# Patient Record
Sex: MALE | Race: WHITE | NOT HISPANIC OR LATINO | URBAN - METROPOLITAN AREA
[De-identification: names, ages, dates, MRNs, and addresses within clinical notes are randomized per-mention and may not be internally consistent; named-entity substitution may affect disease eponyms.]

---

## 2024-11-15 ENCOUNTER — OFFICE VISIT (OUTPATIENT)
Dept: URGENT CARE | Facility: CLINIC | Age: 19
End: 2024-11-15
Payer: COMMERCIAL

## 2024-11-15 VITALS
TEMPERATURE: 98 F | BODY MASS INDEX: 26.69 KG/M2 | SYSTOLIC BLOOD PRESSURE: 122 MMHG | DIASTOLIC BLOOD PRESSURE: 70 MMHG | WEIGHT: 208 LBS | HEIGHT: 74 IN | HEART RATE: 87 BPM | RESPIRATION RATE: 18 BRPM | OXYGEN SATURATION: 97 %

## 2024-11-15 DIAGNOSIS — K59.09 OTHER CONSTIPATION: ICD-10-CM

## 2024-11-15 DIAGNOSIS — R07.9 CHEST PAIN AT REST: Primary | ICD-10-CM

## 2024-11-15 DIAGNOSIS — Z87.19 HISTORY OF GASTROESOPHAGEAL REFLUX (GERD): ICD-10-CM

## 2024-11-15 DIAGNOSIS — R06.02 SOB (SHORTNESS OF BREATH): ICD-10-CM

## 2024-11-15 DIAGNOSIS — J45.990 BRONCHOSPASM, EXERCISE-INDUCED: ICD-10-CM

## 2024-11-15 LAB
OHS QRS DURATION: 98 MS
OHS QTC CALCULATION: 431 MS

## 2024-11-15 PROCEDURE — 74018 RADEX ABDOMEN 1 VIEW: CPT | Mod: S$GLB,,, | Performed by: RADIOLOGY

## 2024-11-15 PROCEDURE — 93005 ELECTROCARDIOGRAM TRACING: CPT | Mod: S$GLB,,, | Performed by: NURSE PRACTITIONER

## 2024-11-15 PROCEDURE — 93010 ELECTROCARDIOGRAM REPORT: CPT | Mod: S$GLB,,, | Performed by: INTERNAL MEDICINE

## 2024-11-15 PROCEDURE — 71046 X-RAY EXAM CHEST 2 VIEWS: CPT | Mod: S$GLB,,, | Performed by: RADIOLOGY

## 2024-11-15 RX ORDER — ALUMINUM HYDROXIDE, MAGNESIUM HYDROXIDE, AND SIMETHICONE 1200; 120; 1200 MG/30ML; MG/30ML; MG/30ML
30 SUSPENSION ORAL
Status: COMPLETED | OUTPATIENT
Start: 2024-11-15 | End: 2024-11-15

## 2024-11-15 RX ORDER — DIPHENHYDRAMINE HCL 12.5MG/5ML
12.5 ELIXIR ORAL
Status: COMPLETED | OUTPATIENT
Start: 2024-11-15 | End: 2024-11-15

## 2024-11-15 RX ORDER — LIDOCAINE HYDROCHLORIDE 20 MG/ML
15 SOLUTION OROPHARYNGEAL
Status: COMPLETED | OUTPATIENT
Start: 2024-11-15 | End: 2024-11-15

## 2024-11-15 RX ADMIN — ALUMINUM HYDROXIDE, MAGNESIUM HYDROXIDE, AND SIMETHICONE 30 ML: 1200; 120; 1200 SUSPENSION ORAL at 03:11

## 2024-11-15 RX ADMIN — Medication 12.5 MG: at 04:11

## 2024-11-15 RX ADMIN — LIDOCAINE HYDROCHLORIDE 15 ML: 20 SOLUTION OROPHARYNGEAL at 04:11

## 2024-11-15 NOTE — PATIENT INSTRUCTIONS
Chest Pain That Is Not Caused by the Heart Discharge Instructions   About this topic   Chest pain is not always caused by heart disease. There are many other things that can cause it. Muscle strain, lung problems, acid reflux, swallowing tube irritation, and anxiety can all cause chest pain. Treatment will depend on the cause. Rest and pain drugs may be used to treat muscle strain. Changing your eating habits and using drugs that lower stomach acid may be used to treat stomach problems. Knowing the pain is not coming from your heart may help with anxiety. Sometimes, calming drugs are needed.  You may have had one or more of the following signs:  Burning feeling in your throat, upper belly, or behind the chest bone  Fast heartbeat  Problems breathing like breathing fast, wheezing, coughing, feeling short of breath  Feeling faint or sweating  Anxiety  Pain in your shoulder, upper back, chest wall, or muscles. This may be due to lifting a heavy object.  Feeling of a lump or tightness of the food pipe in the chest  Upset stomach, belly pain, or throwing up    What care is needed at home?   Ask your doctor what you need to do when you go home. Make sure you ask questions if you do not understand what the doctor says. This way you will know what you need to do.  If your doctor tells you to use heat, put a heating pad on the painful part for no more than 20 minutes at a time. Never go to sleep with a heating pad on as this can cause burns.  If the chest pain is caused by coughing, using a cool mist humidifier may help your breathing.      If you have been discharged from the clinic prior to your point of care test results being completed, please make sure to check your [employer] account.  If there is a change in treatment, we will communicate with you through here.  If your test is positive, and medications are ordered, these will be sent to your preferred pharmacy.   If your test is negative, no further steps needed. If  you do not hear from us or have questions, please call the clinic.        - You must understand that you have received an Urgent Care treatment only and that you may be released before all of your medical problems are known or treated.   - You, the patient, will arrange for follow up care as instructed with your primary care provider or recommended specialist.   -  Please arrange follow up with your primary medical clinic as soon as possible.   - If your condition worsens or fails to improve we recommend that you receive another evaluation at the ER immediately or contact your PCP to discuss your concerns, or return here.   - Please do not drive or make any important decisions for 24 hours if you have received any pain medications, sedatives or mood altering drugs during your visit.    WE CANNOT RULE OUT ALL POSSIBLE CAUSES OF YOUR SYMPTOMS IN THE URGENT CARE SETTING.  PLEASE GO TO THE ER IF YOU FEELS YOUR CONDITION IS WORSENING OR YOU WOULD LIKE EMERGENT EVALUATION.  GO TO THE EMERGENCY DEPARTMENT FOR ANY NEW OR WORSENING SYMPTOMS INCLUDING:  WORSENING ABDOMINAL PAIN, DARK/BLACK/BLOODY BOWEL MOVEMENTS, VOMITING BLOOD, HARD ABDOMEN, FEVER, CHEST PAIN, SHORTNESS OF BREATH, LOSS OF CONSCIOUSNESS, OR ANY OTHER CONCERN.

## 2024-11-15 NOTE — PROGRESS NOTES
Patient presents with chest pain left side of chest with sharp pain.Patient stated when he takes a deep breath it hurts,if he bends over or turns his body.   Symptoms started at about 1 pm

## 2024-11-15 NOTE — PROGRESS NOTES
"Subjective:      Patient ID: Pranay Tariq is a 19 y.o. male.    Vitals:  height is 6' 2" (1.88 m) and weight is 94.3 kg (208 lb 0.1 oz). His oral temperature is 98 °F (36.7 °C). His blood pressure is 122/70 and his pulse is 87. His respiration is 18 and oxygen saturation is 97%.     Chief Complaint: Chest Pain      Patient presents with chest pain left side of chest with sharp pain.Patient stated when he takes a deep breath it hurts,if he bends over or turns his body.   Symptoms started at about 1 pm.  This is a re-occurring problem that is usually self limiting, but has lasted longer than usual.         Chest Pain   This is a chronic problem. The current episode started more than 1 year ago. The onset quality is sudden. The problem occurs intermittently. The problem has been gradually worsening. The pain is present in the lateral region. The pain is at a severity of 7/10. The pain is moderate. The quality of the pain is described as burning and sharp. The pain does not radiate. Associated symptoms include shortness of breath. The pain is aggravated by nothing. He has tried nothing for the symptoms. Risk factors include male gender.       Cardiovascular:  Positive for chest pain.   Respiratory:  Positive for shortness of breath.       Objective:     Physical Exam   Constitutional: He is oriented to person, place, and time. He appears well-developed. He is cooperative.  Non-toxic appearance. He does not appear ill. No distress.   HENT:   Head: Normocephalic and atraumatic.   Ears:   Right Ear: Hearing, tympanic membrane, external ear and ear canal normal.   Left Ear: Hearing, tympanic membrane, external ear and ear canal normal.   Nose: Nose normal. No mucosal edema, rhinorrhea or nasal deformity. No epistaxis. Right sinus exhibits no maxillary sinus tenderness and no frontal sinus tenderness. Left sinus exhibits no maxillary sinus tenderness and no frontal sinus tenderness.   Mouth/Throat: Uvula is midline, " oropharynx is clear and moist and mucous membranes are normal. No trismus in the jaw. Normal dentition. No uvula swelling. No posterior oropharyngeal erythema.   Eyes: Conjunctivae and lids are normal. Right eye exhibits no discharge. Left eye exhibits no discharge. No scleral icterus.   Neck: Trachea normal and phonation normal. Neck supple. No JVD present.   Cardiovascular: Normal rate, S1 normal, S2 normal, normal heart sounds and normal pulses. A regularly irregular rhythm present.   No murmur heard.Exam reveals no distant heart sounds.   Pulmonary/Chest: Effort normal and breath sounds normal. No respiratory distress.   Pain with movement, but no pain with palpation.  Pain is intermittent, and has occurred in the past several times with eventual relief.             Comments: Pain with movement, but no pain with palpation.  Pain is intermittent, and has occurred in the past several times with eventual relief.    Abdominal: Normal appearance and bowel sounds are normal. He exhibits no distension and no mass. Soft. There is no hepatomegaly. There is no abdominal tenderness.   Musculoskeletal: Normal range of motion.         General: No deformity or edema. Normal range of motion.      Right lower leg: No edema.      Left lower leg: No edema.   Neurological: He is alert and oriented to person, place, and time. He exhibits normal muscle tone. Coordination normal. GCS eye subscore is 4. GCS verbal subscore is 5. GCS motor subscore is 6.   Skin: Skin is warm, dry, intact, not diaphoretic and not pale.   Psychiatric: His speech is normal and behavior is normal. Judgment and thought content normal.   Nursing note and vitals reviewed.      Assessment:     1. Chest pain at rest    2. SOB (shortness of breath)    3. History of gastroesophageal reflux (GERD)    4. Other constipation        Plan:   Patient states only mild temp relief from GI cocktail.  Pain returned as soon as patient moved around.    Chest pain at rest  -      IN OFFICE EKG 12-LEAD (to [place])  -     XR CHEST PA AND LATERAL; Future; Expected date: 11/15/2024  -     aluminum-magnesium hydroxide-simethicone 200-200-20 mg/5 mL suspension 30 mL  -     LIDOcaine viscous HCl 2% oral solution 15 mL  -     diphenhydrAMINE 12.5 mg/5 mL elixir 12.5 mg  -     XR KUB; Future; Expected date: 11/15/2024  -     Ambulatory referral/consult to Internal Medicine    SOB (shortness of breath)  -     IN OFFICE EKG 12-LEAD (to [place])  -     XR CHEST PA AND LATERAL; Future; Expected date: 11/15/2024  -     XR KUB; Future; Expected date: 11/15/2024  -     Ambulatory referral/consult to Internal Medicine    History of gastroesophageal reflux (GERD)  -     XR KUB; Future; Expected date: 11/15/2024  -     Ambulatory referral/consult to Internal Medicine    Other constipation  -     XR KUB; Future; Expected date: 11/15/2024  -     Ambulatory referral/consult to Internal Medicine      Type of Interpretation: Outside Written Report.  Radiology Procedure Done: AP & Lat CXR.  Interpretation: EXAM: XR CHEST PA AND LATERAL     CLINICAL HISTORY:  Chest pain     COMPARISON:  None available.     FINDINGS:  2 view chest.  Heart size is normal and lungs are clear bilaterally.  Pulmonary vasculature is normal.        Impression:     No evidence of acute disease.     Finalized on: 11/15/2024 4:34 PM By:  Iglesia Lawrence  [facility]# [id number]      2024-11-15 16:[id number]    [facility]        Narrative & Impression  EXAM: XR KUB     CLINICAL HISTORY: K 59.09     FINDINGS:  Bone structures appear normal.  No abnormal calcifications are visible.  No bowel distention, free air or abnormal mass effect is identified.        Impression:   Radiographically benign abdomen.     Finalized on: 11/15/2024 4:37 PM By:  Iglesia Lawrence  [facility]# [id number]      2024-11-15 16:[id number]    [facility]                EKG: normal sinus rhythm, with sinus arrhythmias.  Normal ECG.  Vent rate:  73 BPM  WY interval 124 ms  QRS duratio 98 ms  QT/Qtc-Baz  [id number]  P-R-T [facility] [phone]      Medical Decision Making:   History:   Old Medical Records: I decided to obtain old medical records.  Old Records Summarized: records from clinic visits.       <> Summary of Records: Reviewed patient's previous medical records which show a diagnosis history of GERD and bronchitis with bronchospasms.  Initial Assessment:   19-year-old male patient presents in clinic today with chest pain.  Patient states that he has had this type of pain in the past several times and it has always resolved on its own.  Patient states the pain today was increased more than in the past.  Certain movements make the pain worse and he can get and certain positions where there is no pain.  Patient states that pain can occur when he takes a deep breath but not always.  Patient does not seem to be in any extreme distress, but does seem anxious that he is having chest pain.  Patient also states that he has been having some constipation.  Clinical Tests:   Radiological Study: Ordered and Reviewed  Medical Tests: Ordered and Reviewed  Urgent Care Management:  An EKG was performed in clinic which showed a normal sinus rhythm with sinus arrhythmia, otherwise a normal ECG.  An x-ray was taken of his chest as well as his abdomen.  X-ray showed no evidence of acute processes.  KUB showed normal abdomen with no issues.  Some mild constipation possible.  Patient's vital signs were stable.  Patient's only significant risk factor was that he vapes nicotine.  Patient was given a GI cocktail which provided some mild temporary relief but pain returned once patient started moving around.      Patient did say that he had been burping more today and was considering taking some gas medicine to see if it provided relief and also to try and take some more anti acids to see if the pain was GERD related.  Discussed possible lab work and patient wanted to get lab work with someone he could follow up with long-term.    Patient has  not seen a physician in 2 years and does not have a primary care doctor for follow-up.  A referral was put in for an internal medicine referral.  Patient felt stable to go home and was relieved that there was no acute issue immediately identified and his anxiety decreased.  Discussed options with patient including going to ER for further evaluation.  Patient chose to be discharged home and would follow up with primary care referral or if symptoms increased or did not improve, patient would consider going to an ER for advanced imaging and lab work.    Patient was discharged with no signs of cardiac distress, shortness of breath, and stable vital signs.  Additional MDM:   Differential Diagnosis:   Symptom: Chest pain. <> The follow diagnoses were considered and will be evaluated: Chest Pain, Costochondritis, GERD, Herpes Zoster, Musculoskeletal Pain, Pericarditis, Pleural Effusion and Pneumonia.      Heart Score:    History:          Slightly suspicious.  ECG:             Normal  Age:               Less than 45 years  Risk factors: 1-2 risk factors        PARK Score:   Age over 65:                                    0.00   > or = to 3 CAD risk factors:           0.00  Established CAD:                            0.00  > or = to 2 anginal events in the past 24 hours: 0.00  Use of ASA in past 7 days:              0.00  Elevated Enzymes:                         0.00  ST Depression > or = to 0.05 mV:  0.00  PARK score = 0